# Patient Record
Sex: MALE | Race: AMERICAN INDIAN OR ALASKA NATIVE
[De-identification: names, ages, dates, MRNs, and addresses within clinical notes are randomized per-mention and may not be internally consistent; named-entity substitution may affect disease eponyms.]

---

## 2018-01-08 ENCOUNTER — HOSPITAL ENCOUNTER (OUTPATIENT)
Dept: HOSPITAL 67 - AMB | Age: 33
Discharge: ANOTHER HOSPITAL | End: 2018-01-08
Payer: COMMERCIAL

## 2018-01-08 DIAGNOSIS — S01.01XA: Primary | ICD-10-CM
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
## 2018-01-08 DIAGNOSIS — R51: ICD-10-CM
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
## 2018-01-08 PROCEDURE — A0425 GROUND MILEAGE: HCPCS

## 2018-01-08 PROCEDURE — A0429 BLS-EMERGENCY: HCPCS

## 2018-01-15 ENCOUNTER — HOSPITAL ENCOUNTER (EMERGENCY)
Dept: HOSPITAL 67 - ED | Age: 33
Discharge: HOME | End: 2018-01-15
Payer: COMMERCIAL

## 2018-01-15 VITALS — WEIGHT: 170 LBS | BODY MASS INDEX: 27.32 KG/M2 | HEIGHT: 66 IN

## 2018-01-15 DIAGNOSIS — Z48.02: Primary | ICD-10-CM

## 2018-12-06 PROBLEM — 129831005: Status: ACTIVE | Noted: 2018-12-06

## 2020-06-09 ENCOUNTER — OFFICE VISIT (OUTPATIENT)
Dept: URBAN - METROPOLITAN AREA CLINIC 37 | Facility: CLINIC | Age: 35
End: 2020-06-09

## 2020-06-16 ENCOUNTER — OFFICE VISIT (OUTPATIENT)
Dept: URBAN - METROPOLITAN AREA CLINIC 37 | Facility: CLINIC | Age: 35
End: 2020-06-16

## 2020-06-16 VITALS — HEIGHT: 65 IN | BODY MASS INDEX: 27.49 KG/M2 | WEIGHT: 165 LBS

## 2020-06-16 RX ORDER — TENOFOVIR DISOPROXIL FUMARATE 300 MG/1
1 TABLET TABLET, COATED ORAL ONCE A DAY
Qty: 30 | Refills: 5 | Status: DISCONTINUED | COMMUNITY

## 2020-06-16 RX ORDER — TENOFOVIR DISOPROXIL FUMARATE 300 MG/1
1 TABLET TABLET, COATED ORAL ONCE A DAY
Qty: 30 | Refills: 5 | OUTPATIENT

## 2020-06-16 RX ORDER — TENOFOVIR ALAFENAMIDE 25 MG/1
1 TABLET WITH FOOD TABLET ORAL ONCE A DAY
Status: ACTIVE | COMMUNITY

## 2020-06-16 RX ORDER — TENOFOVIR ALAFENAMIDE 25 MG/1
1 TABLET WITH FOOD TABLET ORAL ONCE A DAY
Qty: 30 TABLET | Refills: 5 | OUTPATIENT
Start: 2020-06-15

## 2020-06-16 NOTE — HPI-MIGRATED HPI
Interim investigations : Imaging studies: ->  * US Abd on 06/09/2020: review pending * US RUQ on 12/06/18. No HCC, No GS, CBD = 0.41 cm. * US Abd on 01/12/17: fatty liver. No HCC, no GS. CBD 4.4 mm;   Interim investigations : Labs done on: ->  * 11/21/2019, ordered by PCP:  - CBC with: WBC: 8.3; RBC: 4.96; Hgb: 14.2; Hct: 43.8; Plt: 269 - CMP with Glucose: 91; BUN: 10; Cr: 0.82; Na: 142; K: 4.2; Alb: 4.8; ALT: 33; AST: 18; ALP: 78; Tbili: 0.5 - HBV DNA: 4650 IU/mL - AFP: 1.4;   Hep- B : Family history of liver or GI malignancy -> denies;   Hep- B : Patient denies/admits using supplemental herbal -> denies using supplemental herbal products;   Hep- B : Patient currently has been on -> Recently on Vemildy 25 mg daily since 11/2019 prescribed by PCP.Still buys meds from [place] due to high copay with [employer] plan.  Previously on Tenofovir 300 mg daily since 06/2016, Baraclude 0.5 mg until 2013, then on Tenofovir 300 mg daily on and off in 2015 due to high deductible with his insurance;   Hep- B : Patient denies any symptoms of -> jaundice, ascites/abdominal distension, peripheral edema/swelling in legs, hematemesis or melena, weight loss, change in stool color, change in urine color, shortness of breath, or fever;   Hep- B : Response to therapy has been -> Unknown;   Hep- B : Last follow up OV was -> more than 1 year ago (12/2018);   Hep- B : Diagnosis was made -> years ago on routine labs;   Hep- B : Patient has been using -> non-compliant w/antiviral therapy, failed to f/u as recommended;   Hep- B : Patient is here for -> re-establish care for chronic hepatitis B;

## 2020-06-18 ENCOUNTER — LAB OUTSIDE AN ENCOUNTER (OUTPATIENT)
Dept: URBAN - METROPOLITAN AREA CLINIC 37 | Facility: CLINIC | Age: 35
End: 2020-06-18

## 2020-07-20 ENCOUNTER — OFFICE VISIT (OUTPATIENT)
Dept: URBAN - METROPOLITAN AREA CLINIC 37 | Facility: CLINIC | Age: 35
End: 2020-07-20

## 2020-07-20 VITALS — HEIGHT: 65 IN | BODY MASS INDEX: 27.49 KG/M2 | WEIGHT: 165 LBS

## 2020-07-20 RX ORDER — TENOFOVIR ALAFENAMIDE 25 MG/1
1 TABLET WITH FOOD TABLET ORAL ONCE A DAY
Qty: 30 | Refills: 5 | OUTPATIENT

## 2020-07-20 RX ORDER — TENOFOVIR ALAFENAMIDE 25 MG/1
1 TABLET WITH FOOD TABLET ORAL ONCE A DAY
Qty: 30 TABLET | Refills: 5 | Status: ACTIVE | COMMUNITY
Start: 2020-06-21

## 2020-07-20 NOTE — HPI-MIGRATED HPI
Interim investigations : Labs done on: ->  * 06/23/2020:  - CMP: Glu: 96; BUN: 14; Cr: 1.00; Na: 138; K: 3.9; Alb: 4.9;ALT: 49 (H); AST: 28; ALP: 86; Tbili: 0.4 - HBV DNA: not detected IU/mL - AFP: 1.7  * 11/21/2019, ordered by PCP:  - CBC with: WBC: 8.3; RBC: 4.96; Hgb: 14.2; Hct: 43.8; Plt: 269 - CMP with Glucose: 91; BUN: 10; Cr: 0.82; Na: 142; K: 4.2; Alb: 4.8; ALT: 33; AST: 18; ALP: 78; Tbili: 0.5 - HBV DNA: 4650 IU/mL - AFP: 1.4;   Interim investigations : Imaging studies: ->  * US Abd on 06/09/2020: No focal hepatic lesion or mass. No GS. Pancreas was not well visulaized.  * US RUQ on 12/06/18. No HCC, No GS, CBD = 0.41 cm. * US Abd on 01/12/17: fatty liver. No HCC, no GS. CBD 4.4 mm;   Hep- B : Patient currently has been on -> Recently on Vemildy 25 mg daily since 11/2019 prescribed by PCP.  In  last visit, Rx was sent to [employer] for copay assistance. Previously on Tenofovir 300 mg since 06/2016, Baraclude 0.5 mg until 2013, then on Tenofovir 300 mg daily on and off in 2015 due to high deductible with his insurance;   Hep- B : Response to therapy has been -> Unknown;   Hep- B : Last follow up OV was -> 1 month ago;   Hep- B : Patient denies any symptoms of -> jaundice, ascites/abdominal distension, peripheral edema/swelling in legs, hematemesis or melena, weight loss, change in stool color, change in urine color, shortness of breath, or fever;   Hep- B : Patient is here for -> routine follow up for chronic hepatitis B;   Hep- B : Family history of liver or GI malignancy -> denies;   Hep- B : Patient denies/admits using supplemental herbal -> denies using supplemental herbal products;   Hep- B : Patient has been using -> non-compliant w/antiviral therapy, failed to f/u as recommended;   Hep- B : Diagnosis was made -> years ago on routine labs;

## 2020-10-23 ENCOUNTER — LAB OUTSIDE AN ENCOUNTER (OUTPATIENT)
Dept: URBAN - METROPOLITAN AREA CLINIC 37 | Facility: CLINIC | Age: 35
End: 2020-10-23

## 2020-11-23 ENCOUNTER — OFFICE VISIT (OUTPATIENT)
Dept: URBAN - METROPOLITAN AREA CLINIC 37 | Facility: CLINIC | Age: 35
End: 2020-11-23

## 2020-11-23 RX ORDER — TENOFOVIR ALAFENAMIDE 25 MG/1
1 TABLET WITH FOOD TABLET ORAL ONCE A DAY
Qty: 30 | Refills: 5 | Status: ACTIVE | COMMUNITY

## 2020-11-23 RX ORDER — TENOFOVIR ALAFENAMIDE 25 MG/1
1 TABLET WITH FOOD TABLET ORAL ONCE A DAY
Qty: 30 | Refills: 5 | OUTPATIENT

## 2020-11-23 NOTE — HPI-MIGRATED HPI
Interim investigations : Labs done on: ->  * 10/23/2020: ??? * 06/23/2020:  - CMP: Glu: 96; BUN: 14; Cr: 1.00; Na: 138; K: 3.9; Alb: 4.9;ALT: 49 (H); AST: 28; ALP: 86; Tbili: 0.4 - HBV DNA: not detected IU/mL - AFP: 1.7  * 11/21/2019, ordered by PCP:  - CBC with: WBC: 8.3; RBC: 4.96; Hgb: 14.2; Hct: 43.8; Plt: 269 - CMP with Glucose: 91; BUN: 10; Cr: 0.82; Na: 142; K: 4.2; Alb: 4.8; ALT: 33; AST: 18; ALP: 78; Tbili: 0.5 - HBV DNA: 4650 IU/mL - AFP: 1.4;   Interim investigations : Imaging studies: ->  * US Abd on 06/09/2020: No focal hepatic lesion or mass. No GS. Pancreas was not well visulaized.  * US RUQ on 12/06/18. No HCC, No GS, CBD = 0.41 cm. * US Abd on 01/12/17: fatty liver. No HCC, no GS. CBD 4.4 mm;   Hep- B : Response to therapy has been -> Unknown;   Hep- B : Patient has been using -> non-compliant w/antiviral therapy. Patient was advised to be more compliant with therapy in last visit;   Hep- B : Diagnosis was made -> years ago on routine labs;   Hep- B : Patient currently has been on -> Vemlidy 25mg QD;   Hep- B : Last follow up OV was -> 4 month ago;   Hep- B : Patient denies any symptoms of -> jaundice, ascites/abdominal distension, peripheral edema/swelling in legs, hematemesis or melena, weight loss, change in stool color, change in urine color, shortness of breath, or fever;   Hep- B : Patient is here for -> routine follow up for chronic hepatitis B;   Hep- B : Family history of liver or GI malignancy -> denies;   Hep- B : Patient denies/admits using supplemental herbal -> denies using supplemental herbal products;   Hep- B : Prior Imaging studies showed: -> ;

## 2020-11-25 ENCOUNTER — LAB OUTSIDE AN ENCOUNTER (OUTPATIENT)
Dept: URBAN - METROPOLITAN AREA CLINIC 37 | Facility: CLINIC | Age: 35
End: 2020-11-25

## 2020-12-01 ENCOUNTER — OFFICE VISIT (OUTPATIENT)
Dept: URBAN - METROPOLITAN AREA CLINIC 37 | Facility: CLINIC | Age: 35
End: 2020-12-01

## 2020-12-08 ENCOUNTER — LAB OUTSIDE AN ENCOUNTER (OUTPATIENT)
Dept: URBAN - METROPOLITAN AREA CLINIC 37 | Facility: CLINIC | Age: 35
End: 2020-12-08

## 2020-12-09 ENCOUNTER — LAB OUTSIDE AN ENCOUNTER (OUTPATIENT)
Dept: URBAN - METROPOLITAN AREA CLINIC 37 | Facility: CLINIC | Age: 35
End: 2020-12-09

## 2023-02-14 ENCOUNTER — OFFICE VISIT (OUTPATIENT)
Dept: URBAN - METROPOLITAN AREA CLINIC 37 | Facility: CLINIC | Age: 38
End: 2023-02-14

## 2023-02-14 RX ORDER — TENOFOVIR ALAFENAMIDE 25 MG/1
1 TABLET WITH FOOD TABLET ORAL ONCE A DAY
Qty: 30 | Refills: 5 | OUTPATIENT

## 2023-02-14 RX ORDER — TENOFOVIR ALAFENAMIDE 25 MG/1
1 TABLET WITH FOOD TABLET ORAL ONCE A DAY
Qty: 30 | Refills: 5 | Status: ACTIVE | COMMUNITY

## 2023-02-14 NOTE — HPI-HEP B
Patient is here today for re-establish care for Hep B His last OV was on 07/20/2020. Patient was scheduled to follow up with us in 11/23/2020 but he failed to keep up his appointment

## 2023-02-21 ENCOUNTER — OFFICE VISIT (OUTPATIENT)
Dept: URBAN - METROPOLITAN AREA CLINIC 37 | Facility: CLINIC | Age: 38
End: 2023-02-21
Payer: COMMERCIAL

## 2023-02-21 ENCOUNTER — LAB OUTSIDE AN ENCOUNTER (OUTPATIENT)
Dept: URBAN - METROPOLITAN AREA CLINIC 37 | Facility: CLINIC | Age: 38
End: 2023-02-21

## 2023-02-21 VITALS
SYSTOLIC BLOOD PRESSURE: 138 MMHG | BODY MASS INDEX: 27.16 KG/M2 | DIASTOLIC BLOOD PRESSURE: 98 MMHG | HEIGHT: 65 IN | HEART RATE: 85 BPM | WEIGHT: 163 LBS

## 2023-02-21 DIAGNOSIS — B18.1 CHRONIC VIRAL HEPATITIS B WITHOUT DELTA-AGENT: ICD-10-CM

## 2023-02-21 DIAGNOSIS — K76.0 FATTY (CHANGE OF) LIVER, NOT ELSEWHERE CLASSIFIED: ICD-10-CM

## 2023-02-21 PROCEDURE — 99213 OFFICE O/P EST LOW 20 MIN: CPT | Performed by: NURSE PRACTITIONER

## 2023-02-21 RX ORDER — TENOFOVIR ALAFENAMIDE 25 MG/1
1 TABLET WITH FOOD TABLET ORAL ONCE A DAY
Qty: 30 TABLET | Refills: 5 | OUTPATIENT
Start: 2023-02-21 | End: 2023-08-20

## 2023-02-21 NOTE — HPI-HEP B
Patient is here for re-established care for Hep B His last OV was on 07/20/2020. Patient was scheduled to f/u with us on 11/23/2020 but he failed to keep up with his appointment.  He F/U with PCP since then.  His last OV with PCP was in 2021, therefore, no recent blood test was done. He quit Vemlidy for more than 1 year

## 2023-02-28 ENCOUNTER — LAB OUTSIDE AN ENCOUNTER (OUTPATIENT)
Dept: URBAN - METROPOLITAN AREA CLINIC 37 | Facility: CLINIC | Age: 38
End: 2023-02-28

## 2023-02-28 ENCOUNTER — OFFICE VISIT (OUTPATIENT)
Dept: URBAN - METROPOLITAN AREA CLINIC 38 | Facility: CLINIC | Age: 38
End: 2023-02-28
Payer: COMMERCIAL

## 2023-02-28 DIAGNOSIS — B18.1 CHRONIC VIRAL HEPATITIS B WITHOUT DELTA-AGENT: ICD-10-CM

## 2023-02-28 DIAGNOSIS — K76.0 FATTY (CHANGE OF) LIVER, NOT ELSEWHERE CLASSIFIED: ICD-10-CM

## 2023-02-28 PROCEDURE — 76705 ECHO EXAM OF ABDOMEN: CPT | Performed by: INTERNAL MEDICINE

## 2023-03-06 PROBLEM — 197321007 FATTY LIVER: Status: ACTIVE | Noted: 2017-09-21

## 2023-08-21 ENCOUNTER — LAB OUTSIDE AN ENCOUNTER (OUTPATIENT)
Dept: URBAN - METROPOLITAN AREA CLINIC 37 | Facility: CLINIC | Age: 38
End: 2023-08-21

## 2023-08-21 ENCOUNTER — CLAIMS CREATED FROM THE CLAIM WINDOW (OUTPATIENT)
Dept: URBAN - METROPOLITAN AREA CLINIC 37 | Facility: CLINIC | Age: 38
End: 2023-08-21
Payer: COMMERCIAL

## 2023-08-21 VITALS — HEIGHT: 65 IN | WEIGHT: 171 LBS | BODY MASS INDEX: 28.49 KG/M2

## 2023-08-21 DIAGNOSIS — B18.1 CHRONIC VIRAL HEPATITIS B WITHOUT DELTA-AGENT: ICD-10-CM

## 2023-08-21 DIAGNOSIS — K76.0 FATTY (CHANGE OF) LIVER, NOT ELSEWHERE CLASSIFIED: ICD-10-CM

## 2023-08-21 PROCEDURE — 99214 OFFICE O/P EST MOD 30 MIN: CPT | Performed by: NURSE PRACTITIONER

## 2023-08-21 RX ORDER — TENOFOVIR ALAFENAMIDE 25 MG/1
1 TABLET WITH FOOD TABLET ORAL ONCE A DAY
Qty: 30 TABLET | Refills: 5 | OUTPATIENT
Start: 2023-08-21 | End: 2024-02-17

## 2023-08-25 LAB
A/G RATIO: 1.5
AFP, SERUM, TUMOR MARKER: 2.1
ALBUMIN: 4.6
ALKALINE PHOSPHATASE: 75
ALT (SGPT): 41
AST (SGOT): 23
BILIRUBIN, TOTAL: 0.6
BUN/CREATININE RATIO: (no result)
BUN: 12
CALCIUM: 9.4
CARBON DIOXIDE, TOTAL: 22
CHLORIDE: 106
CREATININE: 0.92
EGFR: 108
GLOBULIN, TOTAL: 3
GLUCOSE: 77
HEPATITIS B VIRUS DNA: 1.91
HEPATITIS B VIRUS DNA: 81
POTASSIUM: 3.9
PROTEIN, TOTAL: 7.6
SODIUM: 141

## 2023-08-28 ENCOUNTER — LAB OUTSIDE AN ENCOUNTER (OUTPATIENT)
Dept: URBAN - METROPOLITAN AREA CLINIC 37 | Facility: CLINIC | Age: 38
End: 2023-08-28

## 2024-02-19 ENCOUNTER — LAB (OUTPATIENT)
Dept: URBAN - METROPOLITAN AREA CLINIC 37 | Facility: CLINIC | Age: 39
End: 2024-02-19

## 2024-02-19 ENCOUNTER — OV EP (OUTPATIENT)
Dept: URBAN - METROPOLITAN AREA CLINIC 37 | Facility: CLINIC | Age: 39
End: 2024-02-19
Payer: COMMERCIAL

## 2024-02-19 VITALS — WEIGHT: 158 LBS | HEIGHT: 65 IN | BODY MASS INDEX: 26.33 KG/M2

## 2024-02-19 DIAGNOSIS — K76.0 FATTY (CHANGE OF) LIVER, NOT ELSEWHERE CLASSIFIED: ICD-10-CM

## 2024-02-19 DIAGNOSIS — B18.1 CHRONIC VIRAL HEPATITIS B WITHOUT DELTA-AGENT: ICD-10-CM

## 2024-02-19 PROCEDURE — 99214 OFFICE O/P EST MOD 30 MIN: CPT | Performed by: NURSE PRACTITIONER

## 2024-02-19 RX ORDER — TENOFOVIR ALAFENAMIDE 25 MG/1
1 TABLET WITH FOOD TABLET ORAL ONCE A DAY
Status: ACTIVE | COMMUNITY

## 2024-04-02 ENCOUNTER — US (OUTPATIENT)
Dept: URBAN - METROPOLITAN AREA CLINIC 38 | Facility: CLINIC | Age: 39
End: 2024-04-02
Payer: COMMERCIAL

## 2024-04-02 DIAGNOSIS — R93.2 ABNORMAL ULTRASOUND OF LIVER: ICD-10-CM

## 2024-04-02 PROCEDURE — 76705 ECHO EXAM OF ABDOMEN: CPT | Performed by: INTERNAL MEDICINE

## 2024-08-19 ENCOUNTER — OFFICE VISIT (OUTPATIENT)
Dept: URBAN - METROPOLITAN AREA CLINIC 37 | Facility: CLINIC | Age: 39
End: 2024-08-19

## 2024-08-19 RX ORDER — TENOFOVIR ALAFENAMIDE 25 MG/1
1 TABLET WITH FOOD TABLET ORAL ONCE A DAY
Status: ACTIVE | COMMUNITY

## 2024-10-08 ENCOUNTER — LAB OUTSIDE AN ENCOUNTER (OUTPATIENT)
Dept: URBAN - METROPOLITAN AREA CLINIC 37 | Facility: CLINIC | Age: 39
End: 2024-10-08